# Patient Record
Sex: MALE | Race: OTHER | URBAN - METROPOLITAN AREA
[De-identification: names, ages, dates, MRNs, and addresses within clinical notes are randomized per-mention and may not be internally consistent; named-entity substitution may affect disease eponyms.]

---

## 2017-03-03 NOTE — PATIENT DISCUSSION
POAG, OU: INTRAOCULAR PRESSURE IS WITHIN ACCEPTABLE LIMITS. PT INSTRUCTED TO CONTINUE WITHOUT DROPS AND RETURN FOR FOLLOW-UP AS SCHEDULED.

## 2017-03-03 NOTE — PATIENT DISCUSSION
POAG, OU- S/P SLT OU: INTRAOCULAR PRESSURE IS WITHIN ACCEPTABLE LIMITS. PATIENT INSTRUCTED TO RETURN FOR FOLLOW-UP AS SCHEDULED.   OFF DROPS

## 2017-07-05 NOTE — PATIENT DISCUSSION
GLAUCOMA OU: IOP NOT AT [facility], OU. DISCUSSED OPTIONS WITH PATIENT WITH STARTING DROPS, SLT, AND CYCLO. PATIENT DOES NOT WANT TO TAKE DROPS DUE TO ALL OTHER MEDICATIONS CURRENTLY TAKING. OPTING TO NOT REPEAT SLT SINCE RESULTS HAVE NOT BEEN AS NOTICEABLE.  SCHEDULE CYCLO G6 OS FIRST, THEN OD.

## 2017-11-02 NOTE — PATIENT DISCUSSION
Continue: prednisolone acetate (prednisolone acetate): drops,suspension: 1% 1 drop four times a day as directed into left eye 07-

## 2017-12-14 NOTE — PATIENT DISCUSSION
New Prescription: Lumigan (bimatoprost): drops: 0.01% 1 drop at bedtime as directed into both eyes 12-

## 2018-03-15 NOTE — PATIENT DISCUSSION
GONIO WAS ORDERED AND PERFORMED TODAY TO EVALUATE ANGLES FOR GLAUCOMA.  ANGLES ARE dorzolomide timolol TODAY

## 2018-03-15 NOTE — PATIENT DISCUSSION
POAG, OU: INTRAOCULAR PRESSURE IS WITHIN ACCEPTABLE LIMITS. PT INSTRUCTED TO CONTINUE open AND RETURN FOR FOLLOW-UP AS SCHEDULED.

## 2018-03-15 NOTE — PATIENT DISCUSSION
Continue: dorzolamide-timolol (dorzolamide-timolol): drops: 2-0.5% 1 drop twice a day as directed into both eyes 12-

## 2018-07-12 NOTE — PATIENT DISCUSSION
POAG, OU: INTRAOCULAR PRESSURE IS WITHIN ACCEPTABLE LIMITS. PT INSTRUCTED TO CONTINUE DORZOLAMIDE-TIMOLOL OU BID AND RETURN FOR FOLLOW-UP AS SCHEDULED.

## 2018-09-18 NOTE — PATIENT DISCUSSION
PATIENT C/O BURNING WITH COSOPT AND WANTS TO CHANGE DROPS. WILL GIVE SAMPLE OF TRAVATAN   AND SEND IN TRAVATAN OR LATANOPROST OU QHS.   PATIENT WILL LET US KNOW WHICH ONE TO CALL IN

## 2018-12-28 NOTE — PATIENT DISCUSSION
POAG OU:  VISUAL FIELD SHOWED A SLIGHT PROGRESSION IN THE LEFT EYE. WILL RE PEAT THE SLT.   WILL SCHEDULE SLT OS THEN OD.

## 2019-03-18 NOTE — PATIENT DISCUSSION
POAG, OU- S/P SLT OS: INTRAOCULAR PRESSURE IS WITHIN ACCEPTABLE LIMITS. PATIENT INSTRUCTED TO RETURN FOR FOLLOW-UP AS SCHEDULED.

## 2019-03-18 NOTE — PATIENT DISCUSSION
LACRILUBE:  I recommended the use of Lacrilube or a similar OTC lubricating ophthalmic ointment at bedtime every night for the treatment of severe dry eye problems.

## 2019-04-18 NOTE — PATIENT DISCUSSION
S/P SLT OU. PATIENT CAN'T GET TRAVATAN BECAUSE TOO EXPENSIVE. WILL SWITCH TO LATANOPROST. Queen Adam

## 2019-10-30 NOTE — PATIENT DISCUSSION
POAG, OU: INTRAOCULAR PRESSURE IS WITHIN ACCEPTABLE LIMITS.  PT INSTRUCTED TO CONTINUE LATANOPROST OU QHS AND RETURN FOR FOLLOW-UP AS SCHEDULED

## 2020-01-06 NOTE — PATIENT DISCUSSION
POAG OU:  VISUAL FIELD SHOWS NEW SUPERIOR/INFERIOR/NASAL STEP DEFECTS IN THE RIGHT EYE AND SUPERIOR/INFERIOR/NASAL STEP CHANGES IN THE LEF Jacob Juan. ADD DROPS/SLT.

## 2020-02-12 NOTE — PATIENT DISCUSSION
POAG OU: VISUAL FIELD SHOWED PROGRESSION OF VISION LOSS IN THE RIGHT EYE SHOWING A PROGRESSION OF INFERIOR NASAL STEP DEFECT AND STABLE DEFECTS OS. DISCUSSED OPTION OF ADDING ANOTHER NIGHT TIME DROP VS SLT. DISCUSSED RBA'S OF BOTH. PATIENT WISHES TO PROCEED WITH DROPS. WILL PRESCRIBE RHOPRESSA OU QHS AND CONTINUE LATANOPROST OU QHS AND WILL DO SLT OD THEN OS.

## 2020-05-01 NOTE — PATIENT DISCUSSION
Continue: dorzolamide-timolol (dorzolamide-timolol): drops: 2-0.5% 1 drop twice a day into both eyes 02-

## 2020-05-15 NOTE — PATIENT DISCUSSION
Continue: prednisolone acetate (prednisolone acetate): drops,suspension: 1% 1 drop four times a day as directed into affected eye 05-

## 2022-05-26 ENCOUNTER — NEW PATIENT (OUTPATIENT)
Dept: URBAN - METROPOLITAN AREA CLINIC 32 | Facility: CLINIC | Age: 87
End: 2022-05-26

## 2022-05-26 DIAGNOSIS — H43.813: ICD-10-CM

## 2022-05-26 DIAGNOSIS — H35.051: ICD-10-CM

## 2022-05-26 DIAGNOSIS — H35.3121: ICD-10-CM

## 2022-05-26 PROCEDURE — 99203 OFFICE O/P NEW LOW 30 MIN: CPT

## 2022-05-26 PROCEDURE — 92134 CPTRZ OPH DX IMG PST SGM RTA: CPT

## 2022-05-26 ASSESSMENT — TONOMETRY
OD_IOP_MMHG: 16
OS_IOP_MMHG: 15

## 2022-05-26 ASSESSMENT — KERATOMETRY
OS_K2POWER_DIOPTERS: 42.25
OS_K1POWER_DIOPTERS: 43.50
OD_K1POWER_DIOPTERS: 43.50
OS_AXISANGLE2_DEGREES: 80
OD_AXISANGLE2_DEGREES: 85
OS_AXISANGLE_DEGREES: 170
OD_AXISANGLE_DEGREES: 175
OD_K2POWER_DIOPTERS: 42.50

## 2022-05-26 ASSESSMENT — VISUAL ACUITY
OD_CC: 20/200
OS_CC: 20/70
OS_CC: 20/40-2
OD_CC: 20/200

## 2022-06-13 NOTE — PROCEDURE NOTE: CLINICAL
PROCEDURE NOTE: Eylea PFS #1 OD. Diagnosis: Neovascular AMD with Active CNV. Anesthesia: Lidocaine 4%. Prep: Betadine Drops. Prior to injection, risks/benefits/alternatives discussed including but not limited to infection, loss of vision or eye, hemorrhage, cataract, glaucoma, retinal tears or detachment. The patient wished to proceed with treatment. Betadine prep was performed. Topical anesthesia was induced with Alcaine. Additional anesthesia was achieved using drop(s) or injection checked above. A drop of Povidone-iodine 5% ophthalmic solution was instilled over the injection site and in the inferior fornix. A single use prefilled syringe of intravitreal Eylea 2mg/0.05ml was used and excess was disposed of as waste. The needle was passed 3.0 mm posterior to the limbus in pseudophakic patients, and 3.5 mm posterior to the limbus in phakic patients. Injection time: *. Patient tolerated procedure well. There were no complications. The eye was irrigated with sterile irrigating solution. Post procedure instructions given. The patient was instructed to use Artificial Tears q.i.d. p.r.n for comfort. The patient was instructed to return for re-evaluation in approximately 4-12 weeks depending on his/her condition and was told to call immediately if vision decreases and/or if his/her eye becomes red, painful, and/or light sensitive. The patient was instructed to go to the emergency room or call 911 if unable to reach the doctor within an hour or two of trying or calling. Janet Plascencia

## 2022-06-14 NOTE — PROCEDURE NOTE: CLINICAL
PROCEDURE NOTE: Excision of Eyelid Lesion Right Upper Lid. Diagnosis: Other Benign Neoplasm of Skin of Eyelid, Including Canthus. Anesthesia: Subconjunctival Lidocaine. Risks, benefits and alternatives discussed. Patient desires to proceed with excision of growth/lesion today. A drop of proparacaine was instilled in the involved eye. A tetracaine drop was used on a cotton tipped applicator and placed on the conjunctiva. The involved area was anesthetized using 1% lidocaine with epi. The lesion was excised using mini Westcotts and forceps and discarded. The wound was cauterized to achieve hemostasis. Erythromycin ophthalmic ointment was applied. Post op instructions were given to the patient. The patient tolerated the procedure well and left in good condition. The patient understands to call us for any concerns. Dustin Benitez

## 2022-07-28 NOTE — PROCEDURE NOTE: CLINICAL

## 2022-09-06 NOTE — PROCEDURE NOTE: CLINICAL

## 2022-10-25 NOTE — PROCEDURE NOTE: CLINICAL

## 2022-12-20 NOTE — PROCEDURE NOTE: CLINICAL

## 2023-06-12 NOTE — PATIENT DISCUSSION
Continue: latanoprost (latanoprost): drops: 0.005% 1 drop at bedtime into both eyes 04- Azelaic Acid Pregnancy And Lactation Text: This medication is considered safe during pregnancy and breast feeding.